# Patient Record
(demographics unavailable — no encounter records)

---

## 2025-01-16 NOTE — PHYSICAL EXAM
[Well Groomed] : well groomed [General Appearance - In No Acute Distress] : no acute distress [Normal Conjunctiva] : the conjunctiva exhibited no abnormalities [Eyelids - No Xanthelasma] : the eyelids demonstrated no xanthelasmas [Normal Oral Mucosa] : normal oral mucosa [No Oral Pallor] : no oral pallor [No Oral Cyanosis] : no oral cyanosis [Normal Jugular Venous A Waves Present] : normal jugular venous A waves present [Normal Jugular Venous V Waves Present] : normal jugular venous V waves present [No Jugular Venous Hutchinson A Waves] : no jugular venous hutchinson A waves [Respiration, Rhythm And Depth] : normal respiratory rhythm and effort [Exaggerated Use Of Accessory Muscles For Inspiration] : no accessory muscle use [Auscultation Breath Sounds / Voice Sounds] : lungs were clear to auscultation bilaterally [Normal Rate] : normal [Rhythm Regular] : regular [Normal S1] : normal S1 [Normal S2] : normal S2 [No Gallop] : no gallop heard [No Murmur] : no murmurs heard [Right Carotid Bruit] : no bruit heard over the right carotid [Left Carotid Bruit] : no bruit heard over the left carotid [2+] : left 2+ [Bruit] : no bruit heard [No Pitting Edema] : no pitting edema present [Rt] : no varicose veins of the right leg [Lt] : no varicose veins of the left leg [Abdomen Soft] : soft [Abdomen Tenderness] : non-tender [Abdomen Mass (___ Cm)] : no abdominal mass palpated [Abnormal Walk] : normal gait [Gait - Sufficient For Exercise Testing] : the gait was sufficient for exercise testing [Nail Clubbing] : no clubbing of the fingernails [Cyanosis, Localized] : no localized cyanosis [Petechial Hemorrhages (___cm)] : no petechial hemorrhages [Skin Color & Pigmentation] : normal skin color and pigmentation [] : no rash [No Venous Stasis] : no venous stasis [Skin Lesions] : no skin lesions [No Skin Ulcers] : no skin ulcer [No Xanthoma] : no  xanthoma was observed [Oriented To Time, Place, And Person] : oriented to person, place, and time [Affect] : the affect was normal [Mood] : the mood was normal [No Anxiety] : not feeling anxious

## 2025-01-16 NOTE — HISTORY OF PRESENT ILLNESS
[FreeTextEntry1] : 45 year old man with a history of obesity, LEE, ADHD, hypothyroidism, hyperlipidemia, depression, GERD s/p gastric sleeve presents for followup.    He was last here in 2015.  Since then he had  the gastric sleeve but unfortunately has gained much of the weight back. He has been experience mid sternal chest pressure that happens after eating radiating to nose, non-exertional, associated with dyspnea. His GI feels that it could be related to his GB. He has been relatively sedentary. He has mild PRESTON with stairs. He denies any PND., orthopnea, lower extremity edema, palpitations, near syncope, syncope, stroke like symptoms. His exercise tolerance is limited by arthritic complaints. H

## 2025-01-16 NOTE — REVIEW OF SYSTEMS
[SOB] : no shortness of breath [Dyspnea on exertion] : not dyspnea during exertion [Chest Discomfort] : chest discomfort [Lower Ext Edema] : no extremity edema [Leg Claudication] : no intermittent leg claudication [Palpitations] : no palpitations [Syncope] : no syncope [Negative] : Heme/Lymph

## 2025-05-15 NOTE — CARDIOLOGY SUMMARY
[___] : [unfilled] [Normal] : normal [de-identified] : SR [de-identified] : 2/2025 normal LV function.  [de-identified] : 2/2025 TMET 9 METs no ischemic EKG [de-identified] : 1/2025 Ca score 0

## 2025-05-15 NOTE — REVIEW OF SYSTEMS
[Chest Discomfort] : chest discomfort [Negative] : Heme/Lymph [SOB] : no shortness of breath [Dyspnea on exertion] : not dyspnea during exertion [Lower Ext Edema] : no extremity edema [Leg Claudication] : no intermittent leg claudication [Palpitations] : no palpitations [Syncope] : no syncope

## 2025-05-15 NOTE — PHYSICAL EXAM
[Well Groomed] : well groomed [General Appearance - In No Acute Distress] : no acute distress [Normal Conjunctiva] : the conjunctiva exhibited no abnormalities [Eyelids - No Xanthelasma] : the eyelids demonstrated no xanthelasmas [Normal Oral Mucosa] : normal oral mucosa [No Oral Pallor] : no oral pallor [No Oral Cyanosis] : no oral cyanosis [Normal Jugular Venous A Waves Present] : normal jugular venous A waves present [Normal Jugular Venous V Waves Present] : normal jugular venous V waves present [No Jugular Venous Hutchinson A Waves] : no jugular venous hutchisnon A waves [Respiration, Rhythm And Depth] : normal respiratory rhythm and effort [Exaggerated Use Of Accessory Muscles For Inspiration] : no accessory muscle use [Auscultation Breath Sounds / Voice Sounds] : lungs were clear to auscultation bilaterally [Normal Rate] : normal [Rhythm Regular] : regular [Normal S1] : normal S1 [Normal S2] : normal S2 [No Gallop] : no gallop heard [No Murmur] : no murmurs heard [2+] : left 2+ [No Pitting Edema] : no pitting edema present [Abdomen Soft] : soft [Abdomen Tenderness] : non-tender [Abdomen Mass (___ Cm)] : no abdominal mass palpated [Abnormal Walk] : normal gait [Gait - Sufficient For Exercise Testing] : the gait was sufficient for exercise testing [Nail Clubbing] : no clubbing of the fingernails [Cyanosis, Localized] : no localized cyanosis [Petechial Hemorrhages (___cm)] : no petechial hemorrhages [Skin Color & Pigmentation] : normal skin color and pigmentation [] : no rash [No Venous Stasis] : no venous stasis [Skin Lesions] : no skin lesions [No Skin Ulcers] : no skin ulcer [No Xanthoma] : no  xanthoma was observed [Oriented To Time, Place, And Person] : oriented to person, place, and time [Affect] : the affect was normal [Mood] : the mood was normal [No Anxiety] : not feeling anxious [Right Carotid Bruit] : no bruit heard over the right carotid [Left Carotid Bruit] : no bruit heard over the left carotid [Bruit] : no bruit heard [Rt] : no varicose veins of the right leg [Lt] : no varicose veins of the left leg

## 2025-05-15 NOTE — HISTORY OF PRESENT ILLNESS
[FreeTextEntry1] : 45 year old man with a history of obesity, LEE, ADHD, hypothyroidism, hyperlipidemia, depression, GERD s/p gastric sleeve presents for followup.    Since his last visit he had a cholecystecomy. he had  the gastric sleeve in 2015 but unfortunately has gained much of the weight back.  he denies any further chest pain. He notes a fluttering in his chest that intermittently happens. He has been relatively sedentary. He has mild PRESTON with stairs. He denies any PND., orthopnea, lower extremity edema, palpitations, near syncope, syncope, stroke like symptoms. His exercise tolerance is limited by arthritic complaints. H

## 2025-05-15 NOTE — DISCUSSION/SUMMARY
[FreeTextEntry1] : 45 year man with a history as listed presents for a followup cardiac evaluation.  Keshawn is doing well. He denies any anginal symptoms. Clinically he is euvolemic on exam. His EKG did not reveal any significant ischemic changes. He has been having intermittently   His last LDL was 174. He will continue crestor 10mg Qday with Coq10.  he has been dealing with morbid obesity despite a gastric sleeve and has significant sleep apnea. He would be a candidate for Zepbound 2.5mg Qwk. Advised to see his endo.  Exercise and diet counseling was performed in order to reduce her future cardiovascular risk.  He will followup with me in 6 months  or sooner if necessary.     [EKG obtained to assist in diagnosis and management of assessed problem(s)] : EKG obtained to assist in diagnosis and management of assessed problem(s)